# Patient Record
Sex: FEMALE | Race: BLACK OR AFRICAN AMERICAN | NOT HISPANIC OR LATINO | Employment: UNEMPLOYED | URBAN - METROPOLITAN AREA
[De-identification: names, ages, dates, MRNs, and addresses within clinical notes are randomized per-mention and may not be internally consistent; named-entity substitution may affect disease eponyms.]

---

## 2020-11-09 NOTE — PATIENT INSTRUCTIONS
Discussed need for gradual increase of SSRI dose over time, titrating to effect.  Reviewed potential for initial side effects (such as headache, GI symptoms, and dry mouth) that will likely subside after a week or so, but that therapeutic effects will likely take 1-2 weeks - so it's important to stick with medication for at least a month to adequately gauge effect.  Notify me of any significant side effects.  Discussed that treatment with SSRI medications requires a minimum commitment of 9-12 months; shorter courses are associated with rebound symptoms.  Discussed potential long-term side effects including sexual side effects.     Please send me a [facility] [employer] message with an update as to how you are doing in 2 weeks.  Contact me at any time if you are having concerning side effects with the medication.  You should note some improvement though it may be minor within 2 weeks of starting medication.  If tolerating well I would like to see you back in clinic in 4-6 weeks for an office visit.     Welcome to [facility], where we are committed to the art of inspired primary care.  Thank you for choosing us to be a part of your well-being.    The clinic is open Monday through Friday, 8:00 a.m. - 5:00 p.m., and Saturdays from 8:00 a.m. - 12:00 p.m.  After-hours questions are directed to our 24-hour nurse line, which can be reached by calling the clinic at [phone].  You can also contact the clinic through [facility] [employer] [facility], our online patient portal.  (Please allow 1-2 business days for a response via [facility] [employer] [facility].)  If you are not already enrolled in [facility], access instructions are below.    If you need a refill on your prescription, please contact the pharmacy where you filled it, and they will contact our clinic with the details of what is needed.  If your prescription is a controlled substance, you will have a conversation with your provider to determine if you would like to  your  prescription at the clinic or have it mailed to your pharmacy.  Please allow 2-3 business days for all refill requests to be handled.    We look forward to providing you with great care!    Please let me know if you have any questions.  Thank you for allowing me to participate in your care.  It was my pleasure meeting you.  Chantale Peña PA-C      Preventive Health Recommendations  Female Ages 21 to 25     Yearly exam:     See your health care provider every year in order to  o Review health changes.   o Discuss preventive care.    o Review your medicines if your doctor has prescribed any.      You should be tested each year for STDs (sexually transmitted diseases).       Talk to your provider about how often you should have cholesterol testing.      Get a Pap test every three years. If you have an abnormal result, your doctor may have you test more often.      If you are at risk for diabetes, you should have a diabetes test (fasting glucose).     Shots:     Get a flu shot each year.     Get a tetanus shot every 10 years.     Consider getting the shot (vaccine) that prevents cervical cancer (Gardasil).    Nutrition:     Eat at least 5 servings of fruits and vegetables each day.    Eat whole-grain bread, whole-wheat pasta and brown rice instead of white grains and rice.    Get adequate Calcium and Vitamin D.     Lifestyle    Exercise at least 150 minutes a week each week (30 minutes a day, 5 days a week). This will help you control your weight and prevent disease.    Limit alcohol to one drink per day.    No smoking.     Wear sunscreen to prevent skin cancer.    See your dentist every six months for an exam and cleaning.  Patient Education     Depression  Depression is one of the most common mental health problems today. It is not just a state of unhappiness or sadness. It is a true disease. The cause seems to be related to a decrease in chemicals that transmit signals in the brain. Having a family history of  depression, alcoholism, or suicide increases the risk. Chronic illness, chronic pain, migraine headaches, and high emotional stress also increase the risk.  Depression is something we tend to recognize in others, but may have a hard time seeing in ourselves. It can show in many physical and emotional ways:  Loss of appetite  Overeating  Not being able to sleep  Sleeping too much  Tiredness not related to physical exertion  Restlessness or irritability  Slowness of movement or speech  Feeling depressed or withdrawn  Loss of interest in things you once enjoyed  Trouble concentrating, poor memory, trouble making decisions  Thoughts of harming or killing oneself, or thoughts that life is not worth living  Low self-esteem  The treatment for depression may include both medicine and psychotherapy. Antidepressants can reduce suffering and can improve the ability to function during the depressed period. Therapy can offer emotional support and help you understand emotional factors that may be causing the depression.  Home care  Ongoing care and support help people manage this disease. Find a healthcare provider and therapist who meet your needs. Seek help when you feel like you may be getting ill.  Be kind to yourself. Make it a point to do things that you enjoy (gardening, walking in nature, going to a movie). Reward yourself for small successes.  Take care of your physical body. Eat a balanced diet (low in saturated fat and high in fruits and vegetables). Exercise at least 3 times a week for 30 minutes. Even mild-moderate exercise (like brisk walking) can make you feel better.  Don't drink alcohol, which can make depression worse.  Take medicine as prescribed.  Tell each of your healthcare providers about all of the prescription and over-the-counter medicines, vitamins, and supplements you take. Certain supplements interact with medicines and can result in dangerous side effects. Ask your pharmacist when you have questions  about medicine interactions.  Talk with your family and trusted friends about your feelings and thoughts. Ask them to help you recognize behavior changes early so you can get help and, if needed, medicine can be adjusted.    Follow-up care  Follow up with your healthcare provider, or as advised.  Call 911  Call 911 if you:  Have suicidal thoughts, a suicide plan, and the means to carry out the plan; or serious thoughts of hurting someone else   Have trouble breathing  Are very confused  Feel very drowsy or have trouble awakening  Faint or lose consciousness  Have new chest pain that becomes more severe, lasts longer, or spreads into your shoulder, arm, neck, jaw, or back  When to seek medical advice  Call your healthcare provider right away if any of these happen:  Feeling extreme depression, fear, anxiety, or anger toward yourself or others  Feeling out of control  Feeling that you may try to harm yourself or another  Hearing voices that others do not hear  Seeing things that others do not see  Can t sleep or eat for 3 days in a row  Friends or family express concern over your behavior and ask you to seek help  [employer] last reviewed this educational content on 10/1/2017    2754-8920 [employer]. [address] [place], [place] [id number]. All rights reserved. This information is not intended as a substitute for professional medical care. Always follow your healthcare professional's instructions.           Patient Education     Treating Anxiety Disorders with Medicine  An anxiety disorder can make you feel nervous or apprehensive, even without a clear reason. In people age 65 and older, generalized anxiety disorder is one of the most commonly diagnosed anxiety disorders. Many times it occurs with depression. Certain anxiety disorders can cause intense feelings of fear or panic. You may even have physical symptoms such as a racing heartbeat, sweating, or dizziness. If you have these feelings, you don t  have to suffer anymore. Treatment to help you overcome your fears will likely include therapy (also called counseling). Medicine may also be prescribed to help control your symptoms.     Medicines  Certain medicines may be prescribed to help control your symptoms. So you may feel less anxious. You may also feel able to move forward with therapy. At first, medicines and dosages may need to be adjusted to find what works best for you. Try to be patient. Tell your healthcare provider how a medicine makes you feel. This way, you can work together to find the treatment that s best for you. Keep in mind that medicines can have side effects. Talk with your provider about any side effects that are bothering you. Changing the dose or type of medicine may help. Don t stop taking medicine on your own. That can cause symptoms to come back or cause dangerous withdrawal symptoms.   Anti-anxiety medicine. This medicine eases symptoms and helps you relax. Your healthcare provider will explain when and how to use it. It may be prescribed for use before situations that make you anxious. You may also be told to take medicine on a regular schedule. Anti-anxiety medicine may make you feel a little sleepy or  out of it.  Don t drive a car or operate machinery while on this medicine, until you know how it affects you.  Never use alcohol or other drugs with anti-anxiety medicines. This could result in loss of muscular control, sedation, coma, or death. Also, use only the amount of medicine prescribed for you. If you think you may have taken too much, get emergency care right away. Never share your medicines with others. Store these medicines in a safe place that can't be reached by children or visitors.   Keep taking medicines as prescribed  Never change your dosage, share or use another person's medicine, or stop taking your medicines without talking to your healthcare provider first. Keep the following in mind:   Some medicines must be  taken on a schedule. Make this part of your daily routine. For instance, always take your pill before brushing your teeth. A pillbox can help you remember if you ve taken your medicine each day.  Medicines are often taken for 6 to 12 months. Your healthcare provider will then evaluate whether you need to stay on them. Many people who have also had therapy may no longer need medicine to manage anxiety.  You may need to stop taking medicine slowly to give your body time to adjust. When it s time to stop, your healthcare provider will tell you more. Remember: Never stop taking your medicine without talking to your provider first.  If symptoms return, you may need to start taking medicines again.  This isn t your fault. It s just the nature of your anxiety disorder.  What to think about  Side effects. Medicines may cause side effects. Ask your healthcare provider or pharmacist what you can expect. They may have ideas for avoiding some side effects.  Sexual problems. Some antidepressants can affect your desire for sex or your ability to have an orgasm. A change in dosage or medicine often solves the problem. If you have a sexual side effect that concerns you, tell your healthcare provider.  Addiction. If you ve never had a problem with drugs or alcohol, you may not have a problem with medicines used to treat anxiety disorders. But always discuss the medicines with your healthcare provider before taking them. If you have a history of addiction, you may not be able to use certain medicines used to treat anxiety disorders.  Medicine interactions. Always check with your pharmacist before using any over-the-counter medicines (OTCs), including herbal supplements. Some OTCs may interact with your anti-anxiety medicines and increase or decrease their effectiveness.    [employer] last reviewed this educational content on 12/1/2019 2000-2020 [employer]. [address] [place] [address], [place] [address]. All rights  reserved. This information is not intended as a substitute for professional medical care. Always follow your healthcare professional's instructions.

## 2020-11-10 ENCOUNTER — OFFICE VISIT (OUTPATIENT)
Dept: FAMILY MEDICINE | Facility: CLINIC | Age: 25
End: 2020-11-10
Payer: COMMERCIAL

## 2020-11-10 ENCOUNTER — OFFICE VISIT (OUTPATIENT)
Dept: BEHAVIORAL HEALTH | Facility: CLINIC | Age: 25
End: 2020-11-10

## 2020-11-10 VITALS
SYSTOLIC BLOOD PRESSURE: 110 MMHG | WEIGHT: 112.5 LBS | HEIGHT: 65 IN | OXYGEN SATURATION: 97 % | RESPIRATION RATE: 13 BRPM | BODY MASS INDEX: 18.74 KG/M2 | HEART RATE: 64 BPM | TEMPERATURE: 97.9 F | DIASTOLIC BLOOD PRESSURE: 77 MMHG

## 2020-11-10 DIAGNOSIS — F32.1 CURRENT MODERATE EPISODE OF MAJOR DEPRESSIVE DISORDER WITHOUT PRIOR EPISODE (H): Primary | ICD-10-CM

## 2020-11-10 DIAGNOSIS — F10.10 ALCOHOL ABUSE, EPISODIC DRINKING BEHAVIOR: ICD-10-CM

## 2020-11-10 DIAGNOSIS — E55.9 VITAMIN D DEFICIENCY: ICD-10-CM

## 2020-11-10 DIAGNOSIS — F41.1 GAD (GENERALIZED ANXIETY DISORDER): ICD-10-CM

## 2020-11-10 LAB
% GRANULOCYTES: 54.8 %G (ref 40–75)
ERYTHROCYTE [DISTWIDTH] IN BLOOD BY AUTOMATED COUNT: 13 %
GRANULOCYTES #: 2.6 K/UL (ref 1.6–8.3)
HCT VFR BLD AUTO: 43.3 % (ref 35–47)
HEMOGLOBIN: 14 G/DL (ref 11.7–15.7)
LYMPHOCYTES # BLD AUTO: 1.6 K/UL (ref 0.8–5.3)
LYMPHOCYTES NFR BLD AUTO: 33.4 %L (ref 20–48)
MCH RBC QN AUTO: 33 PG (ref 26.5–35)
MCHC RBC AUTO-ENTMCNC: 32.3 G/DL (ref 32–36)
MCV RBC AUTO: 102.1 FL (ref 78–100)
MID #: 0.6 K/UL (ref 0–2.2)
MID %: 11.8 %M (ref 0–20)
PLATELET # BLD AUTO: 232 K/UL (ref 150–450)
RBC # BLD AUTO: 4.24 M/UL (ref 3.8–5.2)
TSH SERPL DL<=0.005 MIU/L-ACNC: 3.11 MU/L (ref 0.4–4)
WBC # BLD AUTO: 4.8 K/UL (ref 4–11)

## 2020-11-10 RX ORDER — FLUOXETINE 10 MG/1
10 CAPSULE ORAL DAILY
Qty: 60 CAPSULE | Refills: 0 | Status: SHIPPED | OUTPATIENT
Start: 2020-11-10 | End: 2021-02-02

## 2020-11-10 SDOH — HEALTH STABILITY: MENTAL HEALTH: HOW OFTEN DO YOU HAVE 6 OR MORE DRINKS ON ONE OCCASION?: NOT ASKED

## 2020-11-10 SDOH — HEALTH STABILITY: MENTAL HEALTH: HOW MANY STANDARD DRINKS CONTAINING ALCOHOL DO YOU HAVE ON A TYPICAL DAY?: NOT ASKED

## 2020-11-10 SDOH — HEALTH STABILITY: MENTAL HEALTH: HOW OFTEN DO YOU HAVE A DRINK CONTAINING ALCOHOL?: NOT ASKED

## 2020-11-10 ASSESSMENT — PATIENT HEALTH QUESTIONNAIRE - PHQ9
5. POOR APPETITE OR OVEREATING: MORE THAN HALF THE DAYS
SUM OF ALL RESPONSES TO PHQ QUESTIONS 1-9: 15

## 2020-11-10 ASSESSMENT — ANXIETY QUESTIONNAIRES
2. NOT BEING ABLE TO STOP OR CONTROL WORRYING: NEARLY EVERY DAY
7. FEELING AFRAID AS IF SOMETHING AWFUL MIGHT HAPPEN: MORE THAN HALF THE DAYS
GAD7 TOTAL SCORE: 16
3. WORRYING TOO MUCH ABOUT DIFFERENT THINGS: MORE THAN HALF THE DAYS
5. BEING SO RESTLESS THAT IT IS HARD TO SIT STILL: SEVERAL DAYS
1. FEELING NERVOUS, ANXIOUS, OR ON EDGE: NEARLY EVERY DAY
IF YOU CHECKED OFF ANY PROBLEMS ON THIS QUESTIONNAIRE, HOW DIFFICULT HAVE THESE PROBLEMS MADE IT FOR YOU TO DO YOUR WORK, TAKE CARE OF THINGS AT HOME, OR GET ALONG WITH OTHER PEOPLE: VERY DIFFICULT
6. BECOMING EASILY ANNOYED OR IRRITABLE: NEARLY EVERY DAY

## 2020-11-10 ASSESSMENT — MIFFLIN-ST. JEOR: SCORE: 1262.43

## 2020-11-10 NOTE — NURSING NOTE
"25 year old  Chief Complaint   Patient presents with     Physical     Referral     mental health       Blood pressure 110/77, pulse 64, temperature 97.9  F (36.6  C), resp. rate 13, height 1.645 m (5' 4.76\"), weight 51 kg (112 lb 8 oz), last menstrual period 11/03/2020, SpO2 97 %, not currently breastfeeding. Body mass index is 18.86 kg/m .  There is no problem list on file for this patient.      Wt Readings from Last 2 Encounters:   11/10/20 51 kg (112 lb 8 oz)     BP Readings from Last 3 Encounters:   11/10/20 110/77         No current outpatient medications on file.     No current facility-administered medications for this visit.        Social History     Tobacco Use     Smoking status: Never Smoker     Smokeless tobacco: Never Used   Substance Use Topics     Alcohol use: Not Currently     Drug use: Never       Health Maintenance Due   Topic Date Due     CHLAMYDIA SCREENING  1997     HIV SCREENING  08/27/2012     HEPATITIS C SCREENING  08/27/2015     PAP  08/27/2018     PHQ-2  01/01/2020     DTAP/TDAP/TD IMMUNIZATION (6 - Td) 02/19/2020       No results found for: PAP      November 10, 2020 9:31 AM  "

## 2020-11-10 NOTE — PROGRESS NOTES
Subjective     Shira Fernando is a 25 year old female new patient.  She does not have a previous primary care provider.  Initially she made the appointment for annual physical but it was clear that we needed to spend time talking about mental health and substance use concerns.    Abnormal Mood Symptoms:  Has been feeling sad and anxious for the past 2 years.  She offers that she has experienced trauma though she was unwilling to give details at the time.  She reports she had difficulty adjusting to college life.    To deal with this she started drinking heavily over the past 4 years.  She reports in the past 2 to 3 months she has been binge drinking on weekends to the point of.  Prior to that she had been binge drinking on daily basis.  Today she reports she has had no alcohol for 3 days.  She denies physical symptoms associated with that other than feeling generally fatigued.    She decided to come in today at the recommendation of her sister who is a good support person for her.  He has been has decided it is time to make some changes in her life and she and her sister are looking at treatment facilities.  Onset/Duration:  2-3 years.  Since she was in college.    Description:   Depression (if yes, do PHQ-9): YES  Anxiety (if yes, do DIMITRI-7): YES  Accompanying Signs & Symptoms:  Still participating in activities that you used to enjoy: no  Fatigue: YES  Irritability: YES  Difficulty concentrating: YES  Changes in appetite: no  Problems with sleep: YES--trouble staying asleep. No medications.  Melatonin helps  Heart racing/beating fast: no  Abnormally elevated, expansive, or irritable mood: no  Persistently increased activity or energy: no  Thoughts of hurting yourself or others: feels hopeless but not thinking about suicide.   History:  Recent stress or major life event: COVID has magnified the issue. Lost a job. Isolating  Prior depression or anxiety: None  Family history of depression or anxiety: YES  Alcohol/drug  "use: YES--has been struggling with alcohol for the past 4 years.  Difficulty sleeping: YES--usually falls asleep okay but then has difficulty waking through the night and getting back to sleep.  Precipitating or alleviating factors: None  Therapies tried and outcome: none.. She is open to medication management as well as counseling.    She denies a history of suicidal thoughts and ideation.  And has had no thoughts of self-harm.  PHQ 11/10/2020   PHQ-9 Total Score 15   Q9: Thoughts of better off dead/self-harm past 2 weeks Not at all     DIMITRI-7 SCORE 11/10/2020   Total Score 16     Social history: She comes from a traditional [place] family she has 6 siblings.  Her parents have  in the last 1 to 2 years.  The younger children and her older sister live with her mother.  She is currently her father.  She feels safe in her current living situation.    Review of Systems   Constitutional, HEENT, cardiovascular, pulmonary, gi and gu systems are negative, except as otherwise noted.      Objective    /77   Pulse 64   Temp 97.9  F (36.6  C)   Resp 13   Ht 1.645 m (5' 4.76\")   Wt 51 kg (112 lb 8 oz)   LMP 11/03/2020 (Approximate)   SpO2 97%   Breastfeeding No   BMI 18.86 kg/m    Body mass index is 18.86 kg/m .  Physical Exam   GENERAL: healthy, alert and no distress  NECK: no adenopathy, no asymmetry, masses, or scars and thyroid normal to palpation  RESP: lungs clear to auscultation - no rales, rhonchi or wheezes  CV: regular rate and rhythm, normal S1 S2, no S3 or S4, no murmur, click or rub, no peripheral edema and peripheral pulses strong  ABDOMEN: soft, nontender, no hepatosplenomegaly, no masses and bowel sounds normal  PSYCH: well dressed and groomed.  Good eye contact and is cooperative. Thoughts linear.  No delusions, compulsions or paranoia.  Affect flat.  Patient denies homicidal and suicidal ideation as well as no thoughts or actions of self-harm.    Assessment & Plan     Current moderate " episode of major depressive disorder without prior episode (H)  - CBC with Diff Plt (LabDAQ)  - TSH with free T4 reflex  - Vitamin D Deficiency  - FLUoxetine (PROZAC) 10 MG capsule  Dispense: 60 capsule; Refill: 0  Counseling encouraged.Will ask [facility] to introduce himself today.  Self care encouraged and discussed.    See patient instruction.  R&B of medication reviewed  Alcohol abuse, episodic drinking behavior  Praise for quitting. Agree treatment would be a good idea.    DIMITRI (generalized anxiety disorder)  - FLUoxetine (PROZAC) 10 MG capsule  Dispense: 60 capsule; Refill: 0            Return in about 2 weeks (around 11/24/2020).    Janae Peña PA-C  [facility]

## 2020-11-10 NOTE — PROGRESS NOTES
"Patient had appointment with her primary care physician. [facility] services were offered. No immediate safety/risk issues were reported or identified.  Explained the role of the [facility] and provided contact information for the [facility].   Shira inquired if [facility] used any \"holistic treatments\", [facility] informed patient that [facility] does not prescribe medication, and that talk therapy is part of a holistic approach, with [facility] supporting diet, exercise, use of supports, engagement in spirituality/Latter day practices etc.  [facility] also emphasized strengths-based approach, sharing how [facility] works with patients to find and build effective strategies.  No safety concerns expressed or displayed.     Shira declined to schedule any appt, declined assistance with anything else today, and stated in the future, if she needs anything, she will contact [facility].     Shawn G. Ullrich, [facility], Behavioral Health Clinician    "

## 2020-11-11 LAB — DEPRECATED CALCIDIOL+CALCIFEROL SERPL-MC: 13 UG/L (ref 20–75)

## 2020-11-11 ASSESSMENT — ANXIETY QUESTIONNAIRES: GAD7 TOTAL SCORE: 16

## 2020-11-12 RX ORDER — ERGOCALCIFEROL 1.25 MG/1
50000 CAPSULE, LIQUID FILLED ORAL WEEKLY
Qty: 8 CAPSULE | Refills: 0 | Status: SHIPPED | OUTPATIENT
Start: 2020-11-12

## 2020-11-13 ENCOUNTER — TELEPHONE (OUTPATIENT)
Dept: FAMILY MEDICINE | Facility: CLINIC | Age: 25
End: 2020-11-13

## 2020-11-13 NOTE — TELEPHONE ENCOUNTER
----- Message from Janae Peña PA-C sent at 11/12/2020 10:21 PM CST -----  PLease call patient with the following message: You are vitamin D deficient.  I will send a prescription to your pharmacy for a high dose vitamin D supplement that you will take ONCE WEEKLY for 8 weeks and then take an over the counter vitamin D supplement of 2000 units once daily.  Your thyroid and blood count results are normal.  PLease let me know if you have any questions.  Encourage her to sign up for [employer].

## 2021-01-01 ENCOUNTER — HEALTH MAINTENANCE LETTER (OUTPATIENT)
Age: 26
End: 2021-01-01

## 2021-01-01 ENCOUNTER — HOSPITAL ENCOUNTER (EMERGENCY)
Facility: CLINIC | Age: 26
Discharge: LEFT BEFORE DISCHARGE | End: 2021-10-09
Payer: COMMERCIAL

## 2021-01-01 VITALS — TEMPERATURE: 96.2 F | HEART RATE: 94 BPM | OXYGEN SATURATION: 97 % | RESPIRATION RATE: 16 BRPM

## 2021-02-02 ENCOUNTER — TELEPHONE (OUTPATIENT)
Dept: FAMILY MEDICINE | Facility: CLINIC | Age: 26
End: 2021-02-02

## 2021-02-02 DIAGNOSIS — F32.1 CURRENT MODERATE EPISODE OF MAJOR DEPRESSIVE DISORDER WITHOUT PRIOR EPISODE (H): ICD-10-CM

## 2021-02-02 DIAGNOSIS — F41.1 GAD (GENERALIZED ANXIETY DISORDER): ICD-10-CM

## 2021-02-02 RX ORDER — FLUOXETINE 10 MG/1
10 CAPSULE ORAL DAILY
Qty: 30 CAPSULE | Refills: 0 | Status: SHIPPED | OUTPATIENT
Start: 2021-02-02

## 2021-02-02 NOTE — TELEPHONE ENCOUNTER
Who is calling? Shira Fernando  Medication name: Prozac 10 MG  Plan B Emergency Contraceptio  Is this a refill request? Yes  Have they contacted the pharmacy?  Yes  Pharmacy: [id number] [place]   Question/Concern: Shira is asking for a refill on Prozac, as well an information for getting Plan B  Would patient like a call back? Yes

## 2021-02-02 NOTE — TELEPHONE ENCOUNTER
Patient is asking for refill of fluoxetine - she is completely out of this medication and last had this about 6 weeks ago. Advised pt that she will need an appointment to follow up with PCP and to restart this medication. Patient agrees to schedule, has video visit on 2/3/21 @ 2:20PM.     Patient also requesting Plan B pill. Last day of unprotected intercourse was about 12 hours ago, about 0130 on 2/2/21. Advised pt that this is available OTC but she is requesting a prescription because she believes her insurance will pay for it. Informed pt that the Emergency Contraceptive Pill is best to start within 72 hours of unprotected intercourse.    Advised pt this message will be routed to PCP.    Christin Monge RN  02/02/21  1:45 PM

## 2021-02-03 NOTE — TELEPHONE ENCOUNTER
Called and patient had already gotten Plan B OTC.    Regarding her request for fluoxetine. I did go ahead and refill the medication for one month and she will make an appointment for follow up in 3-4 weeks by video.      She denies thoughts of suicide and death and she is not self harming. She has stopped drinking and is looking for a counselor.     staff please call patient to help her get this scheduled for a 20 minute video or in person appointment for 3-4 weeks from now.  Thanks you.  Chantale Peña PA-C

## 2021-03-07 ENCOUNTER — HEALTH MAINTENANCE LETTER (OUTPATIENT)
Age: 26
End: 2021-03-07

## 2021-10-09 NOTE — ED TRIAGE NOTES
Pt states increasing anxiety to the point that she cant leave her house for days at a time and recently quit her job.  Pt denies being suicidal.

## 2022-01-01 ENCOUNTER — HEALTH MAINTENANCE LETTER (OUTPATIENT)
Age: 27
End: 2022-01-01

## 2022-09-24 ENCOUNTER — HEALTH MAINTENANCE LETTER (OUTPATIENT)
Age: 27
End: 2022-09-24